# Patient Record
Sex: MALE | Race: WHITE
[De-identification: names, ages, dates, MRNs, and addresses within clinical notes are randomized per-mention and may not be internally consistent; named-entity substitution may affect disease eponyms.]

---

## 2021-12-03 ENCOUNTER — HOSPITAL ENCOUNTER (INPATIENT)
Dept: HOSPITAL 96 - M.ERS | Age: 71
LOS: 13 days | Discharge: HOME HEALTH | DRG: 871 | End: 2021-12-16
Attending: INTERNAL MEDICINE | Admitting: INTERNAL MEDICINE
Payer: OTHER GOVERNMENT

## 2021-12-03 VITALS — SYSTOLIC BLOOD PRESSURE: 119 MMHG | DIASTOLIC BLOOD PRESSURE: 65 MMHG

## 2021-12-03 VITALS — WEIGHT: 172 LBS | HEIGHT: 70 IN | BODY MASS INDEX: 24.62 KG/M2

## 2021-12-03 VITALS — DIASTOLIC BLOOD PRESSURE: 51 MMHG | SYSTOLIC BLOOD PRESSURE: 120 MMHG
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
## 2021-12-03 DIAGNOSIS — G89.29: ICD-10-CM

## 2021-12-03 DIAGNOSIS — E87.6: ICD-10-CM

## 2021-12-03 DIAGNOSIS — D62: ICD-10-CM

## 2021-12-03 DIAGNOSIS — F10.129: ICD-10-CM

## 2021-12-03 DIAGNOSIS — K29.71: ICD-10-CM

## 2021-12-03 DIAGNOSIS — D50.9: ICD-10-CM

## 2021-12-03 DIAGNOSIS — Z71.6: ICD-10-CM
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
## 2021-12-03 DIAGNOSIS — N17.0: ICD-10-CM

## 2021-12-03 DIAGNOSIS — J69.0: ICD-10-CM

## 2021-12-03 DIAGNOSIS — K70.9: ICD-10-CM

## 2021-12-03 DIAGNOSIS — A41.9: Primary | ICD-10-CM

## 2021-12-03 DIAGNOSIS — G62.1: ICD-10-CM
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
## 2021-12-03 DIAGNOSIS — K57.31: ICD-10-CM

## 2021-12-03 DIAGNOSIS — J44.0: ICD-10-CM

## 2021-12-03 DIAGNOSIS — K44.9: ICD-10-CM

## 2021-12-03 DIAGNOSIS — Z20.822: ICD-10-CM

## 2021-12-03 DIAGNOSIS — Z71.41: ICD-10-CM

## 2021-12-03 LAB
ABSOLUTE EOSINOPHILS: 0.2 THOU/UL (ref 0–0.7)
ABSOLUTE MONOCYTES: 0.5 THOU/UL (ref 0–1.2)
ALBUMIN SERPL-MCNC: 2.3 G/DL (ref 3.4–5)
ALP SERPL-CCNC: 235 U/L (ref 46–116)
ALT SERPL-CCNC: 22 U/L (ref 30–65)
ANION GAP SERPL CALC-SCNC: 12 MMOL/L (ref 7–16)
ANISOCYTOSIS BLD QL SMEAR: (no result)
APTT BLD: 23.9 SECONDS (ref 25–31.3)
AST SERPL-CCNC: 64 U/L (ref 15–37)
BILIRUB SERPL-MCNC: 1.7 MG/DL
BILIRUB UR-MCNC: NEGATIVE MG/DL
BUN SERPL-MCNC: 29 MG/DL (ref 7–18)
CALCIUM SERPL-MCNC: 8.3 MG/DL (ref 8.5–10.1)
CHLORIDE SERPL-SCNC: 94 MMOL/L (ref 98–107)
CO2 SERPL-SCNC: 25 MMOL/L (ref 21–32)
COLOR UR: YELLOW
CREAT SERPL-MCNC: 1.6 MG/DL (ref 0.6–1.3)
EOSINOPHIL NFR BLD: 1 %
GLUCOSE SERPL-MCNC: 102 MG/DL (ref 70–99)
GRANULOCYTES NFR BLD MANUAL: 90 %
HCT VFR BLD CALC: 27.6 % (ref 42–52)
HGB BLD-MCNC: 9.1 GM/DL (ref 14–18)
INR PPP: 1.1
KETONES UR STRIP-MCNC: NEGATIVE MG/DL
LYMPHOCYTES # BLD: 0.9 THOU/UL (ref 0.8–5.3)
LYMPHOCYTES NFR BLD AUTO: 6 %
MACROCYTES: (no result)
MCH RBC QN AUTO: 39.4 PG (ref 26–34)
MCHC RBC AUTO-ENTMCNC: 33 G/DL (ref 28–37)
MCV RBC: 119.6 FL (ref 80–100)
MONOCYTES NFR BLD: 3 %
MPV: 8.5 FL. (ref 7.2–11.1)
NEUTROPHILS # BLD: 14 THOU/UL (ref 1.6–8.1)
NUCLEATED RBCS: 1 /100WBC
PLATELET # BLD EST: ADEQUATE 10*3/UL
PLATELET COUNT*: 200 THOU/UL (ref 150–400)
POTASSIUM SERPL-SCNC: 4 MMOL/L (ref 3.5–5.1)
PROT SERPL-MCNC: 7.3 G/DL (ref 6.4–8.2)
PROT UR QL STRIP: NEGATIVE
PROTHROMBIN TIME: 11.4 SECONDS (ref 9.2–11.5)
RBC # BLD AUTO: 2.31 MIL/UL (ref 4.5–6)
RBC # UR STRIP: NEGATIVE /UL
RDW-CV: 16.9 % (ref 10.5–14.5)
SODIUM SERPL-SCNC: 131 MMOL/L (ref 136–145)
SP GR UR STRIP: <= 1.005 (ref 1–1.03)
URINE CLARITY: CLEAR
URINE GLUCOSE-RANDOM: NEGATIVE
URINE LEUKOCYTES-REFLEX: NEGATIVE
URINE NITRITE-REFLEX: NEGATIVE
UROBILINOGEN UR STRIP-ACNC: 0.2 E.U./DL (ref 0.2–1)
WBC # BLD AUTO: 15.5 THOU/UL (ref 4–11)

## 2021-12-03 NOTE — PROC
[address]  [address]                    PROCEDURE REPORT              
_______________________________________________________________________________
 
Name:       JERALD BERNSTEIN               Room:           [address] [place]  
M.R.#:  [id number]      Account #:      [id number]  
Admission:  12/03/21     Attend Phys:    Carmita Gregory MD 
Discharge:               Date of Birth:  [birth year]  
         Report #: [id number]
                                                                                
_______________________________________________________________________________
THIS REPORT FOR:  
 
cc:  [facility] - Clinic physician unknown
     [facility] - Clinic physician unknown                                       
     [facility],Medical Records Staff                                        ~
For GI report, please see the Provation report in Perceptive 7 content.
 
 
 
 
 
 
 
 
 
 
 
 
 
 
 
 
 
 
 
 
 
 
 
 
 
 
 
 
 
 
 
 
 
 
 
 
 
 
                       
                                        By:                                
                 
D: 12/06/21     _______________________________________
T: 12/07/21 1405Medical Records Staff JOSE       /AL

## 2021-12-03 NOTE — PROC
[facility] 
[address] [place]  [address]                    PROCEDURE REPORT              
_______________________________________________________________________________
 
Name:       JERALD BERNSTEIN               Room:           [address] [place]  
M.R.#:  [id number]      Account #:      [id number]  
Admission:  12/03/21     Attend Phys:    Carmita Gregory MD 
Discharge:               Date of Birth:  [birth year]  
         Report #: [id number]
                                                                                
_______________________________________________________________________________
THIS REPORT FOR:  
 
cc:  [facility] - Clinic physician unknown
     [facility] - Clinic physician unknown                                       
     [facility],Medical Records Staff                                        ~
For GI report, please see the Provation report in Perceptive 7 content.
 
 
 
 
 
 
 
 
 
 
 
 
 
 
 
 
 
 
 
 
 
 
 
 
 
 
 
 
 
 
 
 
 
 
 
 
 
 
                       
                                        By:                                
                 
D: 12/08/21     _______________________________________
T: 12/09/21 1153Medical Records Staff JOSE       /AL

## 2021-12-04 VITALS — SYSTOLIC BLOOD PRESSURE: 122 MMHG | DIASTOLIC BLOOD PRESSURE: 66 MMHG

## 2021-12-04 VITALS — SYSTOLIC BLOOD PRESSURE: 132 MMHG | DIASTOLIC BLOOD PRESSURE: 105 MMHG

## 2021-12-04 VITALS — DIASTOLIC BLOOD PRESSURE: 60 MMHG | SYSTOLIC BLOOD PRESSURE: 135 MMHG

## 2021-12-04 VITALS — SYSTOLIC BLOOD PRESSURE: 145 MMHG | DIASTOLIC BLOOD PRESSURE: 71 MMHG

## 2021-12-04 VITALS — DIASTOLIC BLOOD PRESSURE: 56 MMHG | SYSTOLIC BLOOD PRESSURE: 137 MMHG

## 2021-12-04 VITALS — DIASTOLIC BLOOD PRESSURE: 54 MMHG | SYSTOLIC BLOOD PRESSURE: 117 MMHG

## 2021-12-04 VITALS — SYSTOLIC BLOOD PRESSURE: 115 MMHG | DIASTOLIC BLOOD PRESSURE: 50 MMHG

## 2021-12-04 LAB
ALBUMIN SERPL-MCNC: 1.9 G/DL (ref 3.4–5)
ALP SERPL-CCNC: 195 U/L (ref 46–116)
ALT SERPL-CCNC: 20 U/L (ref 30–65)
ANION GAP SERPL CALC-SCNC: 10 MMOL/L (ref 7–16)
AST SERPL-CCNC: 50 U/L (ref 15–37)
BILIRUB SERPL-MCNC: 1.3 MG/DL
BUN SERPL-MCNC: 27 MG/DL (ref 7–18)
CALCIUM SERPL-MCNC: 7.4 MG/DL (ref 8.5–10.1)
CHLORIDE SERPL-SCNC: 99 MMOL/L (ref 98–107)
CO2 SERPL-SCNC: 25 MMOL/L (ref 21–32)
CREAT SERPL-MCNC: 1.1 MG/DL (ref 0.6–1.3)
GLUCOSE SERPL-MCNC: 87 MG/DL (ref 70–99)
HCT VFR BLD CALC: 22.5 % (ref 42–52)
HCT VFR BLD CALC: 23.6 % (ref 42–52)
HGB BLD-MCNC: 7.4 GM/DL (ref 14–18)
HGB BLD-MCNC: 7.8 GM/DL (ref 14–18)
IRON SERPL-MCNC: 118 UG/DL (ref 50–175)
MAGNESIUM SERPL-MCNC: 1.7 MG/DL (ref 1.8–2.4)
MAGNESIUM SERPL-MCNC: 2.4 MG/DL (ref 1.8–2.4)
MCH RBC QN AUTO: 39.7 PG (ref 26–34)
MCHC RBC AUTO-ENTMCNC: 33 G/DL (ref 28–37)
MCV RBC: 120.2 FL (ref 80–100)
MPV: 7.9 FL. (ref 7.2–11.1)
PLATELET COUNT*: 137 THOU/UL (ref 150–400)
POTASSIUM SERPL-SCNC: 3.1 MMOL/L (ref 3.5–5.1)
POTASSIUM SERPL-SCNC: 3.4 MMOL/L (ref 3.5–5.1)
PROT SERPL-MCNC: 6.1 G/DL (ref 6.4–8.2)
RBC # BLD AUTO: 1.96 MIL/UL (ref 4.5–6)
RDW-CV: 16.7 % (ref 10.5–14.5)
SAO2 % BLD FROM PO2: 120 % (ref 20–39)
SODIUM SERPL-SCNC: 134 MMOL/L (ref 136–145)
WBC # BLD AUTO: 10.4 THOU/UL (ref 4–11)

## 2021-12-04 NOTE — EKG
[place] [address]
Phone:  [phone]                     ELECTROCARDIOGRAM REPORT      
_______________________________________________________________________________
 
Name:         BERNSTEIN,JERALD ROLDAN              Room:          [place] [address]    ADM [place] 
[facility]#:    [id number]     Account #:     [id number]  
Admission:    21    Attend Phys:   Carmita Gregory, 
Discharge:                Date of Birth: [birth year]  
Date of Service: 21 1743  Report #:      [id number]
        [id number]
_______________________________________________________________________________
THIS REPORT FOR:  //name//                      
 
                         [facility] ED
                                       
Test Date:    2021               Test Time:    17:43:45
Pat Name:     JERALD BERNSTEIN           Department:   
Patient ID:   [id number]            Room:         [facility]
Gender:       M                        Technician:   KAM
:          1950               Requested By: Elijah Albert
Order Number: [id number] MD:   Nima Gallo
                                 Measurements
Intervals                              Axis          
Rate:         100                      P:            67
NM:           205                      QRS:          51
QRSD:         82                       T:            46
QT:           353                                    
QTc:          456                                    
                           Interpretive Statements
Sinus tachycardia
Borderline T abnormalities, anterior leads
Baseline wander in lead(s) V2
No previous ECG available for comparison
Electronically Signed On 2021 15:20:36 CST by Nima Gallo
[email or web address]
 
 
 
 
 
 
 
 
 
 
 
 
 
 
 
 
 
 
 
 
  <ELECTRONICALLY SIGNED>
                                           By: Nima Gallo MD, FACC   
  21     1520
D: 21 1743   _____________________________________
T: 21 1743   Nima Gallo MD, FAC     /EPI

## 2021-12-04 NOTE — NUR
PT HAD SPILLED FOOD AND DRINK IN HIS BED, PT WAS WIPED DOWN, GIVEN FRESH BED
LINENS AND GOWN. PT SITTING UP IN CLEAN BED,WATCHING TV, CALL LIGHT WITHIN
REACH.

## 2021-12-05 VITALS — DIASTOLIC BLOOD PRESSURE: 76 MMHG | SYSTOLIC BLOOD PRESSURE: 139 MMHG

## 2021-12-05 VITALS — SYSTOLIC BLOOD PRESSURE: 107 MMHG | DIASTOLIC BLOOD PRESSURE: 63 MMHG

## 2021-12-05 VITALS — SYSTOLIC BLOOD PRESSURE: 156 MMHG | DIASTOLIC BLOOD PRESSURE: 81 MMHG

## 2021-12-05 VITALS — DIASTOLIC BLOOD PRESSURE: 63 MMHG | SYSTOLIC BLOOD PRESSURE: 103 MMHG

## 2021-12-05 VITALS — SYSTOLIC BLOOD PRESSURE: 109 MMHG | DIASTOLIC BLOOD PRESSURE: 84 MMHG

## 2021-12-05 VITALS — DIASTOLIC BLOOD PRESSURE: 69 MMHG | SYSTOLIC BLOOD PRESSURE: 116 MMHG

## 2021-12-05 LAB
ABSOLUTE BASOPHILS: 0 THOU/UL (ref 0–0.2)
ABSOLUTE EOSINOPHILS: 0.1 THOU/UL (ref 0–0.7)
ABSOLUTE MONOCYTES: 0.5 THOU/UL (ref 0–1.2)
ALBUMIN SERPL-MCNC: 2.1 G/DL (ref 3.4–5)
ALP SERPL-CCNC: 222 U/L (ref 46–116)
ALT SERPL-CCNC: 26 U/L (ref 30–65)
ANION GAP SERPL CALC-SCNC: 8 MMOL/L (ref 7–16)
AST SERPL-CCNC: 71 U/L (ref 15–37)
BASOPHILS NFR BLD AUTO: 0.3 %
BILIRUB SERPL-MCNC: 1 MG/DL
BUN SERPL-MCNC: 24 MG/DL (ref 7–18)
CALCIUM SERPL-MCNC: 7.8 MG/DL (ref 8.5–10.1)
CHLORIDE SERPL-SCNC: 101 MMOL/L (ref 98–107)
CHOLEST SERPL-MCNC: 114 MG/DL (ref ?–200)
CO2 SERPL-SCNC: 23 MMOL/L (ref 21–32)
CREAT SERPL-MCNC: 1.3 MG/DL (ref 0.6–1.3)
EOSINOPHIL NFR BLD: 1 %
EST. AVERAGE GLUCOSE BLD GHB EST-MCNC: 97 MG/DL
GLUCOSE SERPL-MCNC: 123 MG/DL (ref 70–99)
GLYCOHEMOGLOBIN (HGB A1C): 5 % (ref 4.8–5.6)
GRANULOCYTES NFR BLD MANUAL: 76 %
HCT VFR BLD CALC: 24.3 % (ref 42–52)
HDLC SERPL-MCNC: 16 MG/DL (ref 40–?)
HGB BLD-MCNC: 8.1 GM/DL (ref 14–18)
LDLC SERPL-MCNC: 71 MG/DL (ref ?–100)
LYMPHOCYTES # BLD: 2 THOU/UL (ref 0.8–5.3)
LYMPHOCYTES NFR BLD AUTO: 18.2 %
MCH RBC QN AUTO: 40.7 PG (ref 26–34)
MCHC RBC AUTO-ENTMCNC: 33.4 G/DL (ref 28–37)
MCV RBC: 121.8 FL (ref 80–100)
MONOCYTES NFR BLD: 4.5 %
MPV: 8.8 FL. (ref 7.2–11.1)
NEUTROPHILS # BLD: 8.3 THOU/UL (ref 1.6–8.1)
NUCLEATED RBCS: 1 /100WBC
PLATELET COUNT*: 189 THOU/UL (ref 150–400)
POTASSIUM SERPL-SCNC: 3.7 MMOL/L (ref 3.5–5.1)
PROT SERPL-MCNC: 6.6 G/DL (ref 6.4–8.2)
RBC # BLD AUTO: 1.99 MIL/UL (ref 4.5–6)
RDW-CV: 16.7 % (ref 10.5–14.5)
SERUM ASSESSMENT: (no result)
SODIUM SERPL-SCNC: 132 MMOL/L (ref 136–145)
TC:HDL: 7.1 RATIO
TRIGL SERPL-MCNC: 136 MG/DL (ref ?–150)
VLDLC SERPL CALC-MCNC: 27 MG/DL (ref ?–40)
WBC # BLD AUTO: 11 THOU/UL (ref 4–11)

## 2021-12-05 NOTE — NUR
RECEIVED REPORT. ASSUMED CARE OF PT AROUND 0730. AM ASSESSMENT AND VITALS
COMPLETED AS CHARTED. CARDIAC MONITOR IN PLACE. MEDS PER EMAR. ATIVAN PRN.
INCONTINENT OF BOWEL AND BLADDER MULTIPLE TIMES THIS SHIFT. DRESSING TO RIGHT
HIP CDI. DENIED PAIN THIS SHIFT. FALL PRECAUTIONS IN PLACE. CALL LIGHT WITHIN
REACH. HOURY ROUNDING PERFORMED.

## 2021-12-06 VITALS — DIASTOLIC BLOOD PRESSURE: 82 MMHG | SYSTOLIC BLOOD PRESSURE: 130 MMHG

## 2021-12-06 VITALS — DIASTOLIC BLOOD PRESSURE: 75 MMHG | SYSTOLIC BLOOD PRESSURE: 124 MMHG

## 2021-12-06 VITALS — SYSTOLIC BLOOD PRESSURE: 93 MMHG | DIASTOLIC BLOOD PRESSURE: 54 MMHG

## 2021-12-06 VITALS — DIASTOLIC BLOOD PRESSURE: 78 MMHG | SYSTOLIC BLOOD PRESSURE: 136 MMHG

## 2021-12-06 VITALS — DIASTOLIC BLOOD PRESSURE: 79 MMHG | SYSTOLIC BLOOD PRESSURE: 121 MMHG

## 2021-12-06 LAB
ABSOLUTE BASOPHILS: 0 THOU/UL (ref 0–0.2)
ABSOLUTE EOSINOPHILS: 0.2 THOU/UL (ref 0–0.7)
ABSOLUTE MONOCYTES: 0.9 THOU/UL (ref 0–1.2)
ALBUMIN SERPL-MCNC: 2 G/DL (ref 3.4–5)
ALP SERPL-CCNC: 209 U/L (ref 46–116)
ALT SERPL-CCNC: 29 U/L (ref 30–65)
ANION GAP SERPL CALC-SCNC: 6 MMOL/L (ref 7–16)
AST SERPL-CCNC: 72 U/L (ref 15–37)
BASOPHILS NFR BLD AUTO: 0.1 %
BILIRUB SERPL-MCNC: 0.9 MG/DL
BUN SERPL-MCNC: 18 MG/DL (ref 7–18)
CALCIUM SERPL-MCNC: 7.9 MG/DL (ref 8.5–10.1)
CHLORIDE SERPL-SCNC: 102 MMOL/L (ref 98–107)
CO2 SERPL-SCNC: 24 MMOL/L (ref 21–32)
CREAT SERPL-MCNC: 1.1 MG/DL (ref 0.6–1.3)
EOSINOPHIL NFR BLD: 1.3 %
GLUCOSE SERPL-MCNC: 102 MG/DL (ref 70–99)
GRANULOCYTES NFR BLD MANUAL: 68.6 %
HCT VFR BLD CALC: 23.6 % (ref 42–52)
HGB BLD-MCNC: 7.7 GM/DL (ref 14–18)
LYMPHOCYTES # BLD: 2.9 THOU/UL (ref 0.8–5.3)
LYMPHOCYTES NFR BLD AUTO: 22.6 %
MCH RBC QN AUTO: 39.1 PG (ref 26–34)
MCHC RBC AUTO-ENTMCNC: 32.7 G/DL (ref 28–37)
MCV RBC: 119.4 FL (ref 80–100)
MONOCYTES NFR BLD: 7.4 %
MPV: 7.9 FL. (ref 7.2–11.1)
NEUTROPHILS # BLD: 8.7 THOU/UL (ref 1.6–8.1)
NUCLEATED RBCS: 1 /100WBC
PLATELET COUNT*: 151 THOU/UL (ref 150–400)
POTASSIUM SERPL-SCNC: 3.8 MMOL/L (ref 3.5–5.1)
PREALB SERPL-MCNC: 12.8 MG/DL (ref 18–35.7)
PROT SERPL-MCNC: 6.2 G/DL (ref 6.4–8.2)
RBC # BLD AUTO: 1.97 MIL/UL (ref 4.5–6)
RDW-CV: 16.8 % (ref 10.5–14.5)
SODIUM SERPL-SCNC: 132 MMOL/L (ref 136–145)
WBC # BLD AUTO: 12.7 THOU/UL (ref 4–11)

## 2021-12-06 PROCEDURE — 0DB68ZX EXCISION OF STOMACH, VIA NATURAL OR ARTIFICIAL OPENING ENDOSCOPIC, DIAGNOSTIC: ICD-10-PCS | Performed by: INTERNAL MEDICINE

## 2021-12-06 NOTE — CON
[address]  [address]                    CONSULTATION                  
_______________________________________________________________________________
 
Name:       JERALD BERNSTEIN               Room:           [address] [place]  
M.R.#:  [id number]      Account #:      [id number]  
Admission:  12/03/21     Attend Phys:    Carmita Gregory MD 
Discharge:               Date of Birth:  [birth year]  
         Report #: [id number]
                                                                     [id number]
_______________________________________________________________________________
THIS REPORT FOR:  
 
cc:  [facility] - Clinic physician unknown
     [facility] - Clinic physician unknown                                       
     Lai Bryant DO                                               ~
 
 
DATE OF CONSULTATION: 12/06/2021
 
The patient does not have a PCP.  Please note at the time of this dictation, the
patient was seen and physically examined by myself.
 
REASON FOR CONSULTATION:  Melanotic stool and acute anemia.
 
HISTORY OF PRESENT ILLNESS:  This 71-year-old male who presented to the 
Emergency Room after apparently Police were called to look on him to see what 
was going on regarding the situation.  Apparently, when they arrived at his 
home, they found him that he had fallen.  He was feeling very weak and unable to
move.  At that time, the patient denied any fever or chills.  He said he had 
fell onto his back and he does not remember if he passed out or not.  He states 
for the last 2 months, he has been noticing black stools.  He does take an 
occasional ibuprofen, but nothing on a regular basis.  This is more for just 
generalized aches and pains.  He also described that he had some nausea and 
vomiting, but denied any bright red blood or any coffee-ground emesis noted with
this.  He does state his bowels move daily, soft and formed.  He denies any 
abdominal discomfort at this time.  He does have chronic back pain however.  The
patient has never had an EGD or a colonoscopy done before.  He denies any upper 
gastrointestinal symptoms at this time of any difficulty swallowing or acid 
reflux.
 
PAST MEDICAL HISTORY:  Chronic back pain.
 
PAST SURGICAL HISTORY:  None.
 
FAMILY HISTORY:  Noncontributory.
 
SOCIAL HISTORY:  He lives alone.  He drinks a 6-pack daily and he smokes a pack 
and half daily.
 
REVIEW OF SYSTEMS:  Twelve-point review of systems is essentially negative 
except what is mentioned in the HPI.
 
PHYSICAL EXAMINATION:
VITAL SIGNS:  Temperature 36.3, pulse 98, respirations are 20, blood pressure 
136/78.
HEART:  Regular rate and rhythm.
 
 
 
[place] [address]                    CONSULTATION                  
_______________________________________________________________________________
 
Name:       JERALD BERNSTEIN               Room:           [address] [place]  
[facility]#:  [id number]      Account #:      [id number]  
Admission:  12/03/21     Attend Phys:    Carmita Gregory MD 
Discharge:               Date of Birth:  [birth year]  
         Report #: [id number]
                                                                     [id number]
_______________________________________________________________________________
 
 
LUNGS:  Diminished.
ABDOMEN:  Soft, positive bowel sounds in all 4 quadrants with no masses or 
tenderness noted.
 
LABORATORY DATA:  On admission, he was 8.1, he is now 7.7.  Platelets are 151.  
White count is 12.7.  His BUN on admission was 24, he is now 18.  His GFR is 66,
total bilirubin 0.9, alkaline phosphatase 209, ALT 29, AST is 72.  CRP is 28.4. 
ESR is 94.  CT scan of the abdomen, hepatic steatosis.
 
IMPRESSION:
1.  Acute anemia, likely on chronic.
2.  Melanotic stool.
3.  Elevated liver function test.
4.  Hepatic steatosis.
5.  Leukocytosis.
6.  Alcohol abuse, 6-pack daily.
 
PLAN:
1.  EGD today.
2.  Continue Protonix b.i.d.
3.  We will check GGTP, acute hepatitis panel, an AFP
4.  Ultrasound with Dopplers to further evaluate his liver.
5.  Further recommendations to be made after Dr. Bryant sees the patient and 
performs an EGD.
 
Thank you for allowing us to participate in this patient's care.  Please do not 
hesitate to call with any questions regarding this consult.
 
 
 
 
 
 
 
 
 
 
 
 
 
 
 
<ELECTRONICALLY SIGNED>
                                        By:  Lai Bryant DO          
12/06/21     1608
D: 12/06/21 0842_______________________________________
T: 12/06/21 0923Lai Bryant DO             /nt

## 2021-12-06 NOTE — 2DMMODE
[place] [address]
Phone:  [phone] 2 D/M-MODE ECHOCARDIOGRAM     
_______________________________________________________________________________
 
Name:         JERALD BERNSTEIN              Room:          [address] [place] 
DIO.#:    [id number]     Account #:     [id number]  
Admission:    21    Attend Phys:   Carmita Gregory, 
Discharge:                Date of Birth: [birth year]  
Date of Service: 21 1702  Report #:      [id number]
        [id number]
_______________________________________________________________________________
THIS REPORT FOR:
 
cc:  [facility] - Clinic physician unknown
     [facility] - Clinic physician unknown
     Palomo Garibay MD [facility]        
                                                                       ~
 
--------------- APPROVED REPORT --------------
 
 
Study performed:  2021 14:02:30
 
EXAM: Comprehensive 2D, Doppler, and color-flow 
Echocardiogram 
Patient Location: In-Patient   
Room #:  [facility]     Status:  routine
 
      BSA:         2.00
HR: 89 bpm BP:          136/78 mmHg 
Rhythm: NSR     
 
Other Information 
Study Quality: Adequate
 
Indications
Congestive Heart Failure
 
2D Dimensions
IVSd:  8.50 (7-11mm) LVOT Diam:  20.12 (18-24mm) 
LVDd:  43.40 mm  
PWd:  9.67 (7-11mm) Ascending Ao:  32.57 (22-36mm)
LVDs:  27.21 (25-40mm) 
Aortic Root:  32.66 mm 
 
Volumes
Left Atrial Volume (Systole) 
    LA ESV Index:  17.10 mL/m2
 
Aortic Valve
AoV Peak Kal.:  1.01 m/s 
AO Peak Gr.:  4.12 mmHg  LVOT Max PG:  3.55 mmHg
AO Mean Gr.:  2.45 mmHg  LVOT Mean P.91 mmHg
    LVOT Max V:  0.94 m/s
AO V2 VTI:  20.46 cm  LVOT Mean V:  0.64 m/s
ARELI (VTI):  3.44 cm2  LVOT V1 VTI:  22.10 cm
 
 
 
[place] [address]
Phone:  [phone]                     2 D/M-MODE ECHOCARDIOGRAM     
_______________________________________________________________________________
 
Name:         JERALD BERNSTEIN              Room:          [address] [place] 
..#:    [id number]     Account #:     [id number]  
Admission:    21    Attend Phys:   Carmita Gregory, 
Discharge:                Date of Birth: [birth year]  
Date of Service: 21 1702  Report #:      [id number]
        [id number]
_______________________________________________________________________________
Mitral Valve
    E/A Ratio:  0.75
    MV Decel. Time:  129.63 ms
MV E Max Kal.:  0.69 m/s 
MV PHT:  37.59 ms  
MVA (PHT):  5.85 cm2  
 
TDI
E/Lateral E':  6.90 E/Medial E':  9.86
   Medial E' Kal.:  0.07 m/s
   Lateral E' Kal.:  0.10 m/s
 
Pulmonary Valve
PV Peak Kal.:  0.80 m/s PV Peak Gr.:  2.55 mmHg
 
Left Ventricle
The left ventricle is normal size. There is normal LV segmental wall 
motion. There is normal left ventricular wall thickness. Left 
ventricular systolic function is normal. The left ventricular 
ejection fraction is within the normal range. LVEF is 65-70%. Grade I 
- abnormal relaxation pattern.
 
Right Ventricle
The right ventricle is normal size. The right ventricular systolic 
function is normal.
 
Atria
The left atrium size is normal. The right atrium size is 
normal.
 
Aortic Valve
The aortic valve is normal in structure. No aortic regurgitation is 
present. There is no aortic valvular stenosis.
 
Mitral Valve
The mitral valve is normal in structure. There is no mitral valve 
regurgitation noted. No evidence of mitral valve stenosis.
 
Tricuspid Valve
The tricuspid valve is normal in structure. There is no tricuspid 
valve regurgitation noted.
 
Pulmonic Valve
Pulmonic valve is not well visualized. There is no pulmonic valvular 
regurgitation.
 
 
 
[place] [address]
Phone:  [phone]                     2 D/M-MODE ECHOCARDIOGRAM     
_______________________________________________________________________________
 
Name:         JERALD BERNSTEIN              Room:          [address]#:    [id number]     Account #:     [id number]  
Admission:    21    Attend Phys:   Carmita Gregory, 
Discharge:                Date of Birth: [birth year]  
Date of Service: 21 1702  Report #:      [id number]
        [id number]
_______________________________________________________________________________
Great Vessels
The aortic root is normal in size. IVC is not well 
visualized.
 
Pericardium
There is no pericardial effusion.
 
<Conclusion>
Left ventricular systolic function is normal.
The left ventricular ejection fraction is within the normal range.
 
 
 
 
 
 
 
 
 
 
 
 
 
 
 
 
 
 
 
 
 
 
 
 
 
 
 
 
 
 
 
 
 
 
  <ELECTRONICALLY SIGNED>
                                           By: Palomo Garibay MD, FACC      
  21
D: 21   _____________________________________
T: 21   Palomo Garibay MD, FAC        /INF

## 2021-12-06 NOTE — NUR
CM ASSESSMENT:
PT A&O, AND NORMALLY INDEPENDENT WITH ADL'S. PT RESIDES AT HOME ALONE. PT USES
[facility] FOR MOBILITY. PT HAS 0 HX OF HH OR SNF. PT INFORMS THAT
HIS NEIGHBORS ASSIST HIM AS NEEDED. PT ADMITTED FOR ETOH ABUSE. CM DISCUSSED
THIS WITH THE PT AND OFFERED PT RESOURCES. PT DECLINED.  CM ALSO DISCUSSED SNF
PLACEMENT WITH THE PT PT DECLINED DESPITE EDUCATION AND ENCOURAGEMENT. PT MAY
BENEFIT FROM HH AT D/C. CM WILL REMAIN AVAILABLE TO ASSIST AND FOLLOW AS
NEEDED.

## 2021-12-07 VITALS — SYSTOLIC BLOOD PRESSURE: 157 MMHG | DIASTOLIC BLOOD PRESSURE: 69 MMHG

## 2021-12-07 VITALS — SYSTOLIC BLOOD PRESSURE: 131 MMHG | DIASTOLIC BLOOD PRESSURE: 72 MMHG

## 2021-12-07 VITALS — SYSTOLIC BLOOD PRESSURE: 121 MMHG | DIASTOLIC BLOOD PRESSURE: 87 MMHG

## 2021-12-07 VITALS — SYSTOLIC BLOOD PRESSURE: 113 MMHG | DIASTOLIC BLOOD PRESSURE: 75 MMHG

## 2021-12-07 VITALS — DIASTOLIC BLOOD PRESSURE: 63 MMHG | SYSTOLIC BLOOD PRESSURE: 93 MMHG

## 2021-12-07 VITALS — SYSTOLIC BLOOD PRESSURE: 99 MMHG | DIASTOLIC BLOOD PRESSURE: 61 MMHG

## 2021-12-07 VITALS — SYSTOLIC BLOOD PRESSURE: 148 MMHG | DIASTOLIC BLOOD PRESSURE: 96 MMHG

## 2021-12-07 LAB
%HYPO/RBC NFR BLD AUTO: (no result) %
ABSOLUTE MONOCYTES: 1.1 THOU/UL (ref 0–1.2)
ALBUMIN SERPL-MCNC: 1.8 G/DL (ref 3.4–5)
ALP SERPL-CCNC: 190 U/L (ref 46–116)
ALT SERPL-CCNC: 27 U/L (ref 30–65)
ANION GAP SERPL CALC-SCNC: 5 MMOL/L (ref 7–16)
ANISOCYTOSIS BLD QL SMEAR: (no result)
AST SERPL-CCNC: 74 U/L (ref 15–37)
BILIRUB SERPL-MCNC: 0.8 MG/DL
BUN SERPL-MCNC: 13 MG/DL (ref 7–18)
CALCIUM SERPL-MCNC: 8 MG/DL (ref 8.5–10.1)
CHLORIDE SERPL-SCNC: 103 MMOL/L (ref 98–107)
CO2 SERPL-SCNC: 25 MMOL/L (ref 21–32)
CREAT SERPL-MCNC: 0.9 MG/DL (ref 0.6–1.3)
GLOBULIN TOTAL: 3.2 G/DL (ref 2.2–3.9)
GLUCOSE SERPL-MCNC: 98 MG/DL (ref 70–99)
GRANULOCYTES NFR BLD MANUAL: 68 %
HCT VFR BLD CALC: 21.2 % (ref 42–52)
HGB BLD-MCNC: 6.8 GM/DL (ref 14–18)
LYMPHOCYTES # BLD: 2.5 THOU/UL (ref 0.8–5.3)
LYMPHOCYTES NFR BLD AUTO: 21 %
M PROTEIN SERPL ELPH-MCNC: 0.6 G/DL
MCH RBC QN AUTO: 39.2 PG (ref 26–34)
MCHC RBC AUTO-ENTMCNC: 32.1 G/DL (ref 28–37)
MCV RBC: 122.1 FL (ref 80–100)
MONOCYTES NFR BLD: 9 %
MPV: 8 FL. (ref 7.2–11.1)
NEUTROPHILS # BLD: 8.5 THOU/UL (ref 1.6–8.1)
NUCLEATED RBCS: 0 /100WBC
PLATELET # BLD EST: ADEQUATE 10*3/UL
PLATELET COUNT*: 134 THOU/UL (ref 150–400)
POLYCHROMASIA BLD QL SMEAR: (no result)
POTASSIUM SERPL-SCNC: 3.4 MMOL/L (ref 3.5–5.1)
PROMYELOCYTES NFR BLD: 2 %
PROT SERPL-MCNC: 5.7 G/DL (ref 6.4–8.2)
RBC # BLD AUTO: 1.74 MIL/UL (ref 4.5–6)
RDW-CV: 17 % (ref 10.5–14.5)
SODIUM SERPL-SCNC: 133 MMOL/L (ref 136–145)
WBC # BLD AUTO: 12.1 THOU/UL (ref 4–11)

## 2021-12-07 PROCEDURE — 30233N1 TRANSFUSION OF NONAUTOLOGOUS RED BLOOD CELLS INTO PERIPHERAL VEIN, PERCUTANEOUS APPROACH: ICD-10-PCS | Performed by: INTERNAL MEDICINE

## 2021-12-07 NOTE — NUR
PLAN OF CARE:
PHYSICIAN INFORMS THAT THE PT IS NOT MEDICALLY STABLE AT THIS TIME. GI
CONSULTED. MRI PENDING. PT MAY BENEFIT FROM HH AT D/C. PT/OT EVALS WILL BE
NEEDED TO DETERMINE CM D/C PLANNING NEEDS FOR D/C TO SNF VS HOME WITH HH. CM
WILL REMAIN AVAILABLE TO ASSIST AND FOLLOW AS NEEDED.

## 2021-12-07 NOTE — NUR
CALL FROM BLOOD BANK, BLOOD CATHI. REPORT GIVEN TO MATTI GALVAN. BLOOD
ADMINISTRATION ENDORSED. BLOOD CONSENT SIGNED.

## 2021-12-07 NOTE — NUR
ASSUMED CARE AT 0730. PT IS ALERT ANAD ORIENTED. ASSESSMENT DONE. PT IS
INCONTINENT OF BLADDER. PT IS PREPING FOR COLONOSCOPY IN THE AM. WILL CONTINUE
TO CARE FOR PT.

## 2021-12-08 VITALS — SYSTOLIC BLOOD PRESSURE: 133 MMHG | DIASTOLIC BLOOD PRESSURE: 77 MMHG

## 2021-12-08 VITALS — SYSTOLIC BLOOD PRESSURE: 111 MMHG | DIASTOLIC BLOOD PRESSURE: 64 MMHG

## 2021-12-08 VITALS — SYSTOLIC BLOOD PRESSURE: 116 MMHG | DIASTOLIC BLOOD PRESSURE: 82 MMHG

## 2021-12-08 VITALS — DIASTOLIC BLOOD PRESSURE: 61 MMHG | SYSTOLIC BLOOD PRESSURE: 139 MMHG

## 2021-12-08 VITALS — SYSTOLIC BLOOD PRESSURE: 149 MMHG | DIASTOLIC BLOOD PRESSURE: 65 MMHG

## 2021-12-08 LAB
ABSOLUTE EOSINOPHILS: 0.1 THOU/UL (ref 0–0.7)
ABSOLUTE MONOCYTES: 0.8 THOU/UL (ref 0–1.2)
ANION GAP SERPL CALC-SCNC: 6 MMOL/L (ref 7–16)
ANISOCYTOSIS BLD QL SMEAR: (no result)
BUN SERPL-MCNC: 9 MG/DL (ref 7–18)
CALCIUM SERPL-MCNC: 8.2 MG/DL (ref 8.5–10.1)
CHLORIDE SERPL-SCNC: 103 MMOL/L (ref 98–107)
CO2 SERPL-SCNC: 26 MMOL/L (ref 21–32)
CREAT SERPL-MCNC: 1 MG/DL (ref 0.6–1.3)
EOSINOPHIL NFR BLD: 1 %
GLUCOSE SERPL-MCNC: 115 MG/DL (ref 70–99)
GRANULOCYTES NFR BLD MANUAL: 64 %
HCT VFR BLD CALC: 26.2 % (ref 42–52)
HGB BLD-MCNC: 8.5 GM/DL (ref 14–18)
LYMPHOCYTES # BLD: 2.2 THOU/UL (ref 0.8–5.3)
LYMPHOCYTES NFR BLD AUTO: 20 %
MCH RBC QN AUTO: 35.8 PG (ref 26–34)
MCHC RBC AUTO-ENTMCNC: 32.5 G/DL (ref 28–37)
MCV RBC: 110.3 FL (ref 80–100)
METAMYELOCYTES NFR BLD: 8 %
MONOCYTES NFR BLD: 7 %
MPV: 8.2 FL. (ref 7.2–11.1)
NEUTROPHILS # BLD: 8.1 THOU/UL (ref 1.6–8.1)
NUCLEATED RBCS: 1 /100WBC
PLATELET # BLD EST: ADEQUATE 10*3/UL
PLATELET COUNT*: 148 THOU/UL (ref 150–400)
POIKILOCYTOSIS BLD QL SMEAR: (no result)
POTASSIUM SERPL-SCNC: 2.9 MMOL/L (ref 3.5–5.1)
RBC # BLD AUTO: 2.38 MIL/UL (ref 4.5–6)
RDW-CV: 28.7 % (ref 10.5–14.5)
SODIUM SERPL-SCNC: 135 MMOL/L (ref 136–145)
WBC # BLD AUTO: 11.2 THOU/UL (ref 4–11)

## 2021-12-08 PROCEDURE — 0DJD8ZZ INSPECTION OF LOWER INTESTINAL TRACT, VIA NATURAL OR ARTIFICIAL OPENING ENDOSCOPIC: ICD-10-PCS | Performed by: INTERNAL MEDICINE

## 2021-12-08 PROCEDURE — 05HC33Z INSERTION OF INFUSION DEVICE INTO LEFT BASILIC VEIN, PERCUTANEOUS APPROACH: ICD-10-PCS | Performed by: INTERNAL MEDICINE

## 2021-12-08 NOTE — NUR
PT ALERT BUT SLEPT ALL SHIFT. K+ WAS 2.9 THIS MORNING, IV K+ STARTED PER
PROTOCOL. HE IS Q2 TURN, INCONTINENT OF URINE. HAS BEEN NPO SINCE MIDNIGHT.
RECEIVED DULCOLAX THIS MORNING WITH WATER PER DR YOO ORDERS FOR COLONOSCOPY
LATER TODAY. WILL CONTINUE TO MONITOR.

## 2021-12-09 VITALS — DIASTOLIC BLOOD PRESSURE: 85 MMHG | SYSTOLIC BLOOD PRESSURE: 142 MMHG

## 2021-12-09 VITALS — DIASTOLIC BLOOD PRESSURE: 98 MMHG | SYSTOLIC BLOOD PRESSURE: 140 MMHG

## 2021-12-09 VITALS — DIASTOLIC BLOOD PRESSURE: 88 MMHG | SYSTOLIC BLOOD PRESSURE: 142 MMHG

## 2021-12-09 VITALS — SYSTOLIC BLOOD PRESSURE: 154 MMHG | DIASTOLIC BLOOD PRESSURE: 78 MMHG

## 2021-12-09 VITALS — DIASTOLIC BLOOD PRESSURE: 71 MMHG | SYSTOLIC BLOOD PRESSURE: 160 MMHG

## 2021-12-09 VITALS — SYSTOLIC BLOOD PRESSURE: 115 MMHG | DIASTOLIC BLOOD PRESSURE: 65 MMHG

## 2021-12-09 NOTE — NUR
Oriented x 3 but forgetful. He is SR to ST with 1 degree on the monitor. Hehas
used the urinal to void. viatls stable. He has slept well.

## 2021-12-09 NOTE — PATH
[facility] 
[address] [place]  [address]                    PATHOLOGY RPT PROCEDURE       
_______________________________________________________________________________
 
Name:       JERALD JARQUIN               Room:           [address] [place]  
M.R.#:  [id number]      Account #:      [id number]  
Admission:  12/03/21     Date of Birth:  [birth year]  
Discharge:                             Report #:    [id number]
                                                         Path Case #: [id number]
_______________________________________________________________________________
 
[facility] Accession Number: [id number]
.                                                                01
Material submitted:                                        .
stomach - ANTRAL BIOPSY FOR H. PYLORI
.                                                                01
Clinical history:                                          .
EGD IN OR
.                                                                02
**********************************************************************
Diagnosis:
Antral biopsy:
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
gastritis), negative for Helicobacter pylori organisms and dysplasia.
.
(THIERNO:mml; 12/08/2021)
[facility]  12/08/2021  1606 Local
**********************************************************************
.                                                                02
Comment:
Special stain:  H. pylori immuno
.
(THIERNO:mml; 12/08/2021)
.                                                                02
Electronically signed:                                     .
Isaac Emmanuel MD, Pathologist
NPI- [id number]
.                                                                01
Gross description:                                         .
The specimen is received in formalin, labeled "Jerald Jarquin, antral
biopsy".  Received is a segment of pale tan tissue measuring 0.4 cm in
maximum dimensions.  The specimen is submitted entirely in cassette A1.
(CAA; 12/7/2021)
[facility]  12/07/2021  0918 Local
.                                                                02
Pathologist provided ICD-10:
K29.50
.                                                                02
CPT                                                        .
623268, T03314
Specimen Comment: A courtesy copy of this report has been sent to [phone]
[phone]
Specimen Comment: [id number]
Specimen Comment: Report sent to  / DR YOO
Specimen Comment: A duplicate report has been generated due to demographic
updates.
***Performed at:  01
   [address]  [id number]
 
[address]  [address]                    PATHOLOGY RPT PROCEDURE       
_______________________________________________________________________________
 
Name:       JERALD JARQUIN               Room:           [address] [place]  
[facility]#:  [id number]      Account #:      [id number]  
Admission:  12/03/21     Date of Birth:  [birth year]  
Discharge:                             Report #:    [id number]
                                                         Path Case #: [id number]
_______________________________________________________________________________
   MD Mohan Garcia MD Phone:  [phone]
***Performed at:  02
   [address]  [id number]
   MD Isaac Emmanuel MD Phone:  [phone]

## 2021-12-10 VITALS — DIASTOLIC BLOOD PRESSURE: 68 MMHG | SYSTOLIC BLOOD PRESSURE: 128 MMHG

## 2021-12-10 VITALS — SYSTOLIC BLOOD PRESSURE: 129 MMHG | DIASTOLIC BLOOD PRESSURE: 75 MMHG

## 2021-12-10 VITALS — SYSTOLIC BLOOD PRESSURE: 154 MMHG | DIASTOLIC BLOOD PRESSURE: 76 MMHG

## 2021-12-10 VITALS — DIASTOLIC BLOOD PRESSURE: 78 MMHG | SYSTOLIC BLOOD PRESSURE: 148 MMHG

## 2021-12-10 VITALS — SYSTOLIC BLOOD PRESSURE: 109 MMHG | DIASTOLIC BLOOD PRESSURE: 58 MMHG

## 2021-12-10 NOTE — NUR
PATIENT HAS REMAINED ALERT AND ORIENTED X 4 WITH SLIGHT FORGETFULNESS. RESTING
QUIETLY ON HOURLY ROUNDS. TURNED Q2H WITH ASSIST. INCONT SMALL TO MOD BM AT HS
AND UP TO BSC WITH GAIT BELT, WALKER AND MIN/MOD ASSIST OF 2 WITH ANOTHER MOD
BM. BOTH STOOLS VERY DARK IN COLOR; NEARLY BLACK. VITAL SIGNS STABLE ON ROOM
AIR. MEDS/ANTIBIOTICS PER ORDER. FALL PRECAUTIONS IN PLACE. CONTINUE TO
MONITOR.

## 2021-12-10 NOTE — CON
[address]  [address]                    CONSULTATION                  
_______________________________________________________________________________
 
Name:       JERALD BERNSTEIN               Room:           [address] [place]  
M.R.#:  [id number]      Account #:      [id number]  
Admission:  12/03/21     Attend Phys:    Carmita Gregory MD 
Discharge:               Date of Birth:  [birth year]  
         Report #: [id number]
                                                                     [id number]
_______________________________________________________________________________
THIS REPORT FOR:  
 
cc:  [facility] physician unknown
     [facility] physician unknown                                       
     Tara Palacios DO                                               ~
 
 
DATE OF CONSULTATION: 12/04/2021
 
NEUROLOGY CONSULT
 
HISTORY OF PRESENT ILLNESS:  The patient is a 71-year-old male who tells me that
he fell to the ground, 2 or 3 times.  He states that he did not lose 
consciousness as he was falling, but felt that his legs were weak.  The police 
were called and the patient does admit to alcohol use.  He admitted to a 6-pack 
of beer a day.  He told me that he did not pass out or lose consciousness, but 
when he spoke to Dr. Roe, it states in the note that he fell onto his back 
and does not remember if he passed out.  The patient admits to having tingling 
in the extremities.  He has this in the lower extremities and in his hands and 
he states that he has only had this tingling for 2 weeks.  He also smokes.  He 
has pain in his hips.  He also has pain in his legs.
 
The patient was evaluated in the emergency room.  He has had a chest x-ray, 
pelvis x-ray, abdomen and pelvis CT, cervical spine CT, chest CT and head CT.
 
PAST MEDICAL HISTORY:  Cigarette abuse, alcohol abuse, chronic back pain.
 
PAST SURGICAL HISTORY:  Unremarkable.
 
MEDICATIONS:  None.
 
ALLERGIES:  None.
 
VITAL SIGNS:  Temperature 36.3, pulse rate 98, respiratory rate 19, blood 
pressure 119/57, bedside pulse oximetry 94% on 2 liters.
 
LABORATORY DATA:  White blood cell count 10.4, hemoglobin 7.8, hematocrit 23.6, 
.2, platelet count 137,000.  Coagulation:  INR 1.1.  Urinalysis negative.
 Chemistry:  Sodium 134, potassium 3.1, chloride 99, carbon dioxide 25, BUN 27, 
creatinine 1.1, GFR 66, glucose 87.  Lactic acid 1.7, calcium 7.4, magnesium 
1.7.  Iron 118, TIBC 98.  Iron saturation 120%, ferritin 628, total bilirubin 
1.3, AST 50, ALT 20, alkaline phosphatase 195.  Creatinine kinase 77.  Troponin 
17.  BNP 1773.  Total protein 6.1, albumin 1.9, prealbumin 11.4.  B12 of 452.  
Folate 1.  TSH 1.845.  Serology:  COVID negative.
 
IMAGING:  CT scan of the head demonstrates no acute intracranial abnormality.  
 
 
 
[place] [address]                    CONSULTATION                  
_______________________________________________________________________________
 
Name:       JERALD BERNSTEIN               Room:           [address] [place]  
[facility]#:  [id number]      Account #:      [id number]  
Admission:  12/03/21     Attend Phys:    Carmita Gregory MD 
Discharge:               Date of Birth:  [birth year]  
         Report #: [id number]
                                                                     [id number]
_______________________________________________________________________________
 
 
CT scan of the chest shows multiple healed rib fractures on the right and left, 
atherosclerotic changes present in the coronary arteries, basilar atelectasis 
present on the right.  CT of the cervical spine demonstrates spondylosis and 
uncovertebral joint degenerative change, causing mild-to-moderate foraminal 
stenosis from C3-C4 through C5-C6.  No acute fracture or subluxation is seen.  
CT of the abdomen and pelvis shows a left inguinal hernia, rib fractures of the 
right and left hepatic steatosis with moderate atherosclerotic changes, most 
prominent in the lower aorta, which is felt to cause moderately high-grade 
stenosis of the distal aorta.  Pelvis x-ray shows bilateral femoral acetabular 
impingement.  Chest x-ray shows mild interstitial opacities in the right middle 
lobe and lingula representing either pneumonitis or subsegmental atelectasis and
multiple old healed bilateral rib fractures.
 
NEUROLOGIC:  Cranial nerves II-XII are grossly intact.  Motor exam demonstrates 
the patient is able to lift his arms above his head.  He is able to lift each 
leg off the bed to at least a 45-degree angle.  Reflexes are absent throughout. 
Plantar responses are flexor.  Coordination revealed dysmetria with 
finger-to-nose and heel-to-shin.  The patient has diminished light touch in a 
stocking glove fashion to just above the calves.  Proprioception is greatly 
diminished.
 
IMPRESSION AND PLAN:  My understanding from the patient was that he did not lose
consciousness.  Otherwise, I realized he gave Dr. Roe a different history. 
What I would recommend is an MRI of the head on Monday to look for cerebellar 
atrophy.  The patient has dysmetria in the upper and lower extremities.  I also 
suspect there is a component of peripheral neuropathy secondary to alcohol use, 
although B12 and thyroid are normal.  I will also check a serum protein 
electrophoresis to complete the workup.  He does need an EMG, but this is only 
done as an outpatient.
 
An MRI of the thoracic spine will also be ordered, but this can be done on 
Monday.
 
I thank you for your kind referral of the patient and will continue to follow 
him with you.
 
 
 
 
 
 
 
<ELECTRONICALLY SIGNED>
                                        By:  Tara Palacios DO          
12/10/21     1015
D: 12/04/21 1218_______________________________________
T: 12/04/21 1842Roxane SAlvaro Palacios,              /nt

## 2021-12-10 NOTE — NUR
PLAN OF CARE:
INITIAL PLAN FOR THE PT TO D/C TO SNF WHEN MEDICALLY STABLE. HOWEVER PT
DECLINED SNF AND PLANS TO D/C HOME WITH HH DESPITE EDUCATION AND
ENCOURAGEMENT. PT'S FRIEND INFORMS OF PLAN TO ASSIST HIM AT HOME AT D/C. PT'S
FRIEND PATRICK MUNIZ PROVIDES CONTACT INFO TO RN AND INFORMS OF PLANS TO
ASSIST PT AT HOME AT D/C. CM WILL REMAIN AVAILABLE TO ASSIST AND FOLLOW AS
NEEDED.

## 2021-12-11 VITALS — DIASTOLIC BLOOD PRESSURE: 83 MMHG | SYSTOLIC BLOOD PRESSURE: 139 MMHG

## 2021-12-11 VITALS — DIASTOLIC BLOOD PRESSURE: 81 MMHG | SYSTOLIC BLOOD PRESSURE: 142 MMHG

## 2021-12-11 VITALS — SYSTOLIC BLOOD PRESSURE: 139 MMHG | DIASTOLIC BLOOD PRESSURE: 69 MMHG

## 2021-12-11 LAB
ANION GAP SERPL CALC-SCNC: 6 MMOL/L (ref 7–16)
BUN SERPL-MCNC: 14 MG/DL (ref 7–18)
CALCIUM SERPL-MCNC: 8.3 MG/DL (ref 8.5–10.1)
CHLORIDE SERPL-SCNC: 103 MMOL/L (ref 98–107)
CO2 SERPL-SCNC: 25 MMOL/L (ref 21–32)
CREAT SERPL-MCNC: 1 MG/DL (ref 0.6–1.3)
GLUCOSE SERPL-MCNC: 94 MG/DL (ref 70–99)
HCT VFR BLD CALC: 26 % (ref 42–52)
HGB BLD-MCNC: 8.3 GM/DL (ref 14–18)
MCH RBC QN AUTO: 36.1 PG (ref 26–34)
MCHC RBC AUTO-ENTMCNC: 32.1 G/DL (ref 28–37)
MCV RBC: 112.4 FL (ref 80–100)
MPV: 8.3 FL. (ref 7.2–11.1)
PLATELET COUNT*: 219 THOU/UL (ref 150–400)
POTASSIUM SERPL-SCNC: 4.5 MMOL/L (ref 3.5–5.1)
RBC # BLD AUTO: 2.31 MIL/UL (ref 4.5–6)
RDW-CV: 24.7 % (ref 10.5–14.5)
SODIUM SERPL-SCNC: 134 MMOL/L (ref 136–145)
WBC # BLD AUTO: 12.6 THOU/UL (ref 4–11)

## 2021-12-11 NOTE — NUR
PT TRANSFERRED AT 2030 FROM TELE. A&O X 3-4, FORGETFUL AT TIMES. VSS ON RA.
TYLENOL GIVEN FOR PAIN. PT INCONTINENT, BARRIER CREAM APPLIED FOR REDNESS.
CALL LIGHT WITHIN REACH. WILL CONTINUE TO MONITOR.

## 2021-12-12 VITALS — DIASTOLIC BLOOD PRESSURE: 70 MMHG | SYSTOLIC BLOOD PRESSURE: 152 MMHG

## 2021-12-12 VITALS — DIASTOLIC BLOOD PRESSURE: 65 MMHG | SYSTOLIC BLOOD PRESSURE: 152 MMHG

## 2021-12-12 VITALS — SYSTOLIC BLOOD PRESSURE: 147 MMHG | DIASTOLIC BLOOD PRESSURE: 80 MMHG

## 2021-12-13 VITALS — SYSTOLIC BLOOD PRESSURE: 121 MMHG | DIASTOLIC BLOOD PRESSURE: 72 MMHG

## 2021-12-13 VITALS — DIASTOLIC BLOOD PRESSURE: 71 MMHG | SYSTOLIC BLOOD PRESSURE: 146 MMHG

## 2021-12-13 VITALS — SYSTOLIC BLOOD PRESSURE: 144 MMHG | DIASTOLIC BLOOD PRESSURE: 75 MMHG

## 2021-12-13 VITALS — SYSTOLIC BLOOD PRESSURE: 152 MMHG | DIASTOLIC BLOOD PRESSURE: 68 MMHG

## 2021-12-13 LAB
ALBUMIN SERPL-MCNC: 1.5 G/DL (ref 3.4–5)
ALP SERPL-CCNC: 187 U/L (ref 46–116)
ALT SERPL-CCNC: 31 U/L (ref 30–65)
ANION GAP SERPL CALC-SCNC: 8 MMOL/L (ref 7–16)
AST SERPL-CCNC: 84 U/L (ref 15–37)
BILIRUB SERPL-MCNC: 0.5 MG/DL
BUN SERPL-MCNC: 15 MG/DL (ref 7–18)
CALCIUM SERPL-MCNC: 8.2 MG/DL (ref 8.5–10.1)
CHLORIDE SERPL-SCNC: 105 MMOL/L (ref 98–107)
CO2 SERPL-SCNC: 25 MMOL/L (ref 21–32)
CREAT SERPL-MCNC: 1.1 MG/DL (ref 0.6–1.3)
GLUCOSE SERPL-MCNC: 131 MG/DL (ref 70–99)
HCT VFR BLD CALC: 26.2 % (ref 42–52)
HGB BLD-MCNC: 8.1 GM/DL (ref 14–18)
MAGNESIUM SERPL-MCNC: 2.1 MG/DL (ref 1.8–2.4)
MCH RBC QN AUTO: 34.6 PG (ref 26–34)
MCHC RBC AUTO-ENTMCNC: 30.8 G/DL (ref 28–37)
MCV RBC: 112.2 FL (ref 80–100)
MPV: 8.7 FL. (ref 7.2–11.1)
PLATELET COUNT*: 308 THOU/UL (ref 150–400)
POTASSIUM SERPL-SCNC: 4.2 MMOL/L (ref 3.5–5.1)
PROT SERPL-MCNC: 5.9 G/DL (ref 6.4–8.2)
RBC # BLD AUTO: 2.34 MIL/UL (ref 4.5–6)
RDW-CV: 23.3 % (ref 10.5–14.5)
SODIUM SERPL-SCNC: 138 MMOL/L (ref 136–145)
WBC # BLD AUTO: 15.7 THOU/UL (ref 4–11)

## 2021-12-13 NOTE — NUR
PATIENT SLEPT MOST OF THE NIGHT. IV ANTIBIOTIC WAS GIVEN AS ORDERED. PATIENT
HAD NO COMPLAINTS OF PAIN. PATIENT COULD POSSIBLY DC TO REHAB FACILTY TODAY.
WILL CONTINUE TO MONITOR.

## 2021-12-14 VITALS — DIASTOLIC BLOOD PRESSURE: 56 MMHG | SYSTOLIC BLOOD PRESSURE: 130 MMHG

## 2021-12-14 VITALS — DIASTOLIC BLOOD PRESSURE: 57 MMHG | SYSTOLIC BLOOD PRESSURE: 129 MMHG

## 2021-12-14 VITALS — SYSTOLIC BLOOD PRESSURE: 148 MMHG | DIASTOLIC BLOOD PRESSURE: 78 MMHG

## 2021-12-14 LAB
ALBUMIN SERPL-MCNC: 1.5 G/DL (ref 3.4–5)
ALP SERPL-CCNC: 216 U/L (ref 46–116)
ALT SERPL-CCNC: 36 U/L (ref 30–65)
ANION GAP SERPL CALC-SCNC: 5 MMOL/L (ref 7–16)
AST SERPL-CCNC: 91 U/L (ref 15–37)
BILIRUB SERPL-MCNC: 0.7 MG/DL
BILIRUB UR-MCNC: NEGATIVE MG/DL
BUN SERPL-MCNC: 14 MG/DL (ref 7–18)
CALCIUM SERPL-MCNC: 8.5 MG/DL (ref 8.5–10.1)
CHLORIDE SERPL-SCNC: 104 MMOL/L (ref 98–107)
CO2 SERPL-SCNC: 26 MMOL/L (ref 21–32)
COLOR UR: (no result)
CREAT SERPL-MCNC: 0.9 MG/DL (ref 0.6–1.3)
GLUCOSE SERPL-MCNC: 91 MG/DL (ref 70–99)
HCT VFR BLD CALC: 25.1 % (ref 42–52)
HGB BLD-MCNC: 7.9 GM/DL (ref 14–18)
KETONES UR STRIP-MCNC: (no result) MG/DL
MAGNESIUM SERPL-MCNC: 2.1 MG/DL (ref 1.8–2.4)
MCH RBC QN AUTO: 35.2 PG (ref 26–34)
MCHC RBC AUTO-ENTMCNC: 31.4 G/DL (ref 28–37)
MCV RBC: 112.2 FL (ref 80–100)
MPV: 8.7 FL. (ref 7.2–11.1)
PLATELET COUNT*: 349 THOU/UL (ref 150–400)
POTASSIUM SERPL-SCNC: 4.3 MMOL/L (ref 3.5–5.1)
PROT SERPL-MCNC: 6.1 G/DL (ref 6.4–8.2)
PROT UR QL STRIP: NEGATIVE
RBC # BLD AUTO: 2.24 MIL/UL (ref 4.5–6)
RBC # UR STRIP: NEGATIVE /UL
RDW-CV: 23 % (ref 10.5–14.5)
SODIUM SERPL-SCNC: 135 MMOL/L (ref 136–145)
SP GR UR STRIP: 1.01 (ref 1–1.03)
URINE CLARITY: CLEAR
URINE GLUCOSE-RANDOM: NEGATIVE
URINE LEUKOCYTES-REFLEX: NEGATIVE
URINE NITRITE-REFLEX: NEGATIVE
UROBILINOGEN UR STRIP-ACNC: 1 E.U./DL (ref 0.2–1)
WBC # BLD AUTO: 16.3 THOU/UL (ref 4–11)

## 2021-12-14 PROCEDURE — 5A0935A ASSISTANCE WITH RESPIRATORY VENTILATION, LESS THAN 24 CONSECUTIVE HOURS, HIGH NASAL FLOW/VELOCITY: ICD-10-PCS | Performed by: INTERNAL MEDICINE

## 2021-12-14 NOTE — NUR
CM FOLLOWUP
 
PT NOT YET MED CLEAR BUT EXPECTED TO BE ON 12/15/21. PT AUTH FOR REHAB DENIED
BY INSURANCE. PT NOW OPEN TO SKILLED SERVICES AND REFERRAL FAXED TO [facility]
([phone]). CM TO FOLLOW.

## 2021-12-14 NOTE — NUR
PT AO BUT FORGETFUL AT TIMES OVERNIGHT. SETTING OFF BED ALARM ONCE THIS SHIFT
TRYING TO GET OOB. ROM AIR SAT 98%. PT TURNED AND REPOSITIONED Q2 HOURS AND
PRN FOR SKIN CARE AND COMFORT. DAVID IV SL. AM LABS. UP WITH ASSIST TO BSC, SOME
INCONTINENCE OVERNIGHT, YVETTE CARE GIVEN. CM FOLLOWING FOR DC PLAN, INS AUTH
PENDING FOR REHAB DC [facility]. CALL LITE IN EASY REACH, BED ALARM ON FOR
SAFETY. Patient seen in office today

## 2021-12-15 VITALS — DIASTOLIC BLOOD PRESSURE: 88 MMHG | SYSTOLIC BLOOD PRESSURE: 149 MMHG

## 2021-12-15 VITALS — SYSTOLIC BLOOD PRESSURE: 130 MMHG | DIASTOLIC BLOOD PRESSURE: 56 MMHG

## 2021-12-15 LAB
ALBUMIN SERPL-MCNC: 1.6 G/DL (ref 3.4–5)
ALP SERPL-CCNC: 235 U/L (ref 46–116)
ALT SERPL-CCNC: 38 U/L (ref 30–65)
ANION GAP SERPL CALC-SCNC: 4 MMOL/L (ref 7–16)
AST SERPL-CCNC: 95 U/L (ref 15–37)
BILIRUB SERPL-MCNC: 0.7 MG/DL
BUN SERPL-MCNC: 15 MG/DL (ref 7–18)
CALCIUM SERPL-MCNC: 8.3 MG/DL (ref 8.5–10.1)
CHLORIDE SERPL-SCNC: 103 MMOL/L (ref 98–107)
CO2 SERPL-SCNC: 27 MMOL/L (ref 21–32)
CREAT SERPL-MCNC: 0.9 MG/DL (ref 0.6–1.3)
GLUCOSE SERPL-MCNC: 100 MG/DL (ref 70–99)
HCT VFR BLD CALC: 24.4 % (ref 42–52)
HGB BLD-MCNC: 7.6 GM/DL (ref 14–18)
MAGNESIUM SERPL-MCNC: 2 MG/DL (ref 1.8–2.4)
MCH RBC QN AUTO: 34.6 PG (ref 26–34)
MCHC RBC AUTO-ENTMCNC: 31.3 G/DL (ref 28–37)
MCV RBC: 110.5 FL (ref 80–100)
MPV: 8.8 FL. (ref 7.2–11.1)
PLATELET COUNT*: 396 THOU/UL (ref 150–400)
POTASSIUM SERPL-SCNC: 4.7 MMOL/L (ref 3.5–5.1)
PROT SERPL-MCNC: 6.4 G/DL (ref 6.4–8.2)
RBC # BLD AUTO: 2.2 MIL/UL (ref 4.5–6)
RDW-CV: 22.9 % (ref 10.5–14.5)
SODIUM SERPL-SCNC: 134 MMOL/L (ref 136–145)
WBC # BLD AUTO: 15.8 THOU/UL (ref 4–11)

## 2021-12-15 NOTE — NUR
CM FOLLOWUP
 
PT MED CLEAR. ON 12/14/21, PT WAS PENDING AUTH FOR ACUTE REHAB. ON 12/14/21,
PT'S VA INSURANCE CONTACTED [facility] TO RELAY THAT THE AUTH WAS DECLINED AND PT
WAS MORE APPROPRITE FOR SKILLED SERVICES. PT WAS THEN REFERRED FOR SKILLED
WITH [facility]. ON 12/15/21, [facility] ACCEPTED PT TO
FACILITY AND ATTEMPTED TO OBTAIN AUTH, BUT WAS TOLD [facility] WOULD NEED TO SUBMIT
FOR AUTH. CM CONTACTED THE [facility] AND SPOKE WITH CHARLES ([phone]) AND DARON
([phone]) WHO REPORTED PT'S VA INSURANCE WOULD NOT COVER SKIILLED
SERVICES. CM RELAYED THAT PT WAS ALREADY DENIED ARU AND A VA INSURANCE
REPRESENTATIVE (REZA) STATED PT WAS MORE APPROPRITE FOR SKILLED
SERVICES. CM WAS TOLD THAT THE VA DID STATE PT WAS MORE APPROPRITE FOR
SKILLED SERVICES, BUT THAT THE VA DID NOT STATE THEY WOULD PAY FOR THE SKILLED
SERVICES. VA STAFF INDICATED PT IS ELIGIBLE FOR MEDICARE AND ADVISED THAT HE
OBTAIN MEDICARE COVERAGE. CM TO FOLLOWUP.

## 2021-12-15 NOTE — NUR
GINA from [phone]
Acute Rehab denied, skilled level more appropriate. Notified CM RB/JANETT and Dr SUTTON

## 2021-12-15 NOTE — NUR
PATIENT RESTING IN BED.  LEFT UPPER MIDLINE, SALINE LOCKED, PATENT.  EDEMA TO
RIGHT HAND.  NO C/O PAIN/DISCOMFORT.  ALL QUESTIONS AND CONCERNS ADDRESSED.

## 2021-12-16 VITALS — SYSTOLIC BLOOD PRESSURE: 149 MMHG | DIASTOLIC BLOOD PRESSURE: 88 MMHG

## 2021-12-16 VITALS — DIASTOLIC BLOOD PRESSURE: 88 MMHG | SYSTOLIC BLOOD PRESSURE: 149 MMHG

## 2021-12-16 NOTE — NUR
CM FOLLOWUP
 
PT MED CLEAR. PT DC HOME WITH HH [facility]IA [facility] [phone]. PT TRANSPORTED HOME
VIA [facility] [phone]. PT'S FRIEND (PATRICK - [phone]) TO
PROVIDE PT IN HOME CARE.

## 2021-12-16 NOTE — NUR
PATIENT DISCHARGED AT THIS TIME VIA WHEELCHAIR ACCOMPANIED BY TRANSPORTER TO
TRANSPORT VEHICLE.  DISCHARGE INSTRUCTIONS REVIEWED, ACKNOWLEDGED
UNDERSTANDING.

## 2021-12-16 NOTE — CON
[address]  [address]                    CONSULTATION                  
_______________________________________________________________________________
 
Name:       JERALD BERNSTEIN               Room:           [address] [place]  
.R.#:  [id number]      Account #:      [id number]  
Admission:  12/03/21     Attend Phys:    Carmita Gregory MD 
Discharge:               Date of Birth:  [birth year]  
         Report #: [id number]
                                                                     [id number]
_______________________________________________________________________________
THIS REPORT FOR:  
 
cc:  [facility] physician unknown
     [facility] physician unknown                                       
     Ad Lock II DO                                           ~
 
 
DATE OF CONSULTATION: 12/14/2021
 
ORTHOPEDIC CONSULTATION
 
CHIEF COMPLAINT:  Right hip pain.
 
HISTORY OF PRESENT ILLNESS:  The patient is a 71-year-old gentleman who 
presented to the hospital after being found at home unable to move.  Apparently,
he had a fall, was feeling very weak.  Police were called in the situation.  The
patient only drinks beers about 6-pack per day.  Denies any fevers or chills.  
Does have pain throughout his back as well as lower extremities; however, we are
consulted for the right hip, which does show pain within the anterior thigh.  He
does have full range of motion with flexion and extension of the knee and ankle 
also have range of motion.  The patient states the pain is worse with standing; 
however, he is able to ambulate with a walker.  Pain lasts for hours a day.  It 
decreases with current pain medication as well as lidocaine patch and is a dull 
ache within the groin and thigh.  We are consulted for further evaluation.
 
PAST MEDICAL HISTORY:  Chronic back pain, renal failure, alcohol abuse.
 
ALLERGIES:  No known.
 
FAMILY HISTORY:  Reviewed and noncontributory.
 
SOCIAL HISTORY:  The patient denies any tobacco or illicit drugs.  Does drink 
alcohol.
 
REVIEW OF SYSTEMS:  A 12-system review of systems was reviewed with pertinent 
positives in the HPI.
 
PHYSICAL EXAMINATION:
GENERAL:  The patient is a pleasant 71-year-old male, in minimal distress.
HEENT:  Head:  Normocephalic, atraumatic.  Eyes:  Pupils equal, round, reactive 
to light and accommodation.  Nose:  Clear, without ulcerations.  Normal 
turbinates.  Mouth:  Moist mucous membranes.  Tongue midline.
NECK:  Supple, no JVD, no bruit.
CARDIOVASCULAR:  Regular rate and rhythm.  Heart sounds present.  Capillary 
refill normal.
LUNGS:  Clear to auscultation bilaterally.  No wheezes or crackles.
 
 
 
[place] [address]                    CONSULTATION                  
_______________________________________________________________________________
 
Name:       JERALD BERNSTEIN               Room:           [address]#:  [id number]      Account #:      [id number]  
Admission:  12/03/21     Attend Phys:    Carmita Gregory MD 
Discharge:               Date of Birth:  [birth year]  
         Report #: [id number]
                                                                     [id number]
_______________________________________________________________________________
 
 
ABDOMEN:  Soft, bowel sounds present, no masses.
EXTREMITIES:  No clubbing or cyanosis.  Does have mild pain in the right 
anterior thigh.  Negative log roll.  The patient does have flexion, extension of
the hip without significant pain.  Cap refill is present to the lower 
extremities.  The patient does have knee and ankle range of motion.  He is able 
to ambulate with my exam.
SKIN:  Normal color.  No masses, no nodules.
PSYCHIATRIC:  Appropriate mood and affect.
NEUROLOGIC:  The patient is alert and conversational and moving all extremities.
 
IMAGING:  X-ray of the pelvis yields bilateral femoral acetabular impingement.  
No bony abnormalities noted.  Chest x-ray shows mild interstitial opacities in 
the right middle lobe.  A CT of the cervical spine shows spondylosis with 
degenerative changes at the C3-4 through C5-6.  No acute changes.
 
ASSESSMENT:  Right hip and thigh strain, possible contusion from fall, weakness,
alcohol abuse, renal failure.
 
PLAN:  At this time, the patient is getting some improvement with his lidocaine 
patches.  He is also getting improvement with walking and ambulation.  Would 
recommend continuation of physical therapy as well as lidocaine patches, ice to 
the thigh as needed for pain.  Continue to monitor for any change in symptoms; 
however, I would like see him back in 1-2 weeks in the outpatient clinic.
 
I appreciate this consultation.
 
 
 
 
 
 
 
 
 
 
 
 
 
 
 
 
 
<ELECTRONICALLY SIGNED>
                                        By:  Ad Lock II, DO     
12/16/21     0956
D: 12/14/21 2209_______________________________________
T: 12/14/21 2337Ad Lock II, DO        /nt